# Patient Record
Sex: FEMALE | URBAN - METROPOLITAN AREA
[De-identification: names, ages, dates, MRNs, and addresses within clinical notes are randomized per-mention and may not be internally consistent; named-entity substitution may affect disease eponyms.]

---

## 2019-11-04 ENCOUNTER — APPOINTMENT (OUTPATIENT)
Age: 49
Setting detail: DERMATOLOGY
End: 2019-11-06

## 2019-11-04 DIAGNOSIS — Z41.9 ENCOUNTER FOR PROCEDURE FOR PURPOSES OTHER THAN REMEDYING HEALTH STATE, UNSPECIFIED: ICD-10-CM

## 2019-11-04 PROCEDURE — OTHER JUVEDERM VOLLURE XC INJECTION: OTHER

## 2019-11-04 PROCEDURE — OTHER JUVEDERM ULTRA XC INJECTION: OTHER

## 2019-11-04 NOTE — PROCEDURE: JUVEDERM ULTRA XC INJECTION
Consent: -Assessment \"Ej Aesthetics Scales\":\\nWrinkles: mild, Lines present at rest: none to mild, Folds: mild, Volume loss/skin laxity: mild.\\n-Treatment areas reviewed with patient holding a hand held mirror. The patient has realistic expectations.  \\n-Written consent obtained.  Questions answered. Patient verbalized understanding of the content.  See written informed consent on file. \\n-Topical anesthesia was achieved with 23% lidocaine, 7% tetracaine for 30 minutes.\\n-Topical anesthesia(lot#[id number]@3 exp. 12/27/19) was removed. \\n-Listerine mouth rinse provided.\\n-The skin was prepped with with alcohol and chlorhexadine prep. \\n-Aspetic technique maintained throughout procedure.\\n-The filler was administered to the treatment areas noted above. \\n-A 31g BD insuline syringe was back filled with JuveDerm Ultra to deliver micro-droplet technique where necessary.\\n-The patient tolerated the procedure well w/o incident. \\n-Vaseline applied to lips\\n-Additional product (hyaluronic ) may be necessary for optimal correction following treatment and/or maintenance. Consent: -Assessment \"Ej Aesthetics Scales\":\\nWrinkles: mild, Lines present at rest: none to mild, Folds: mild, Volume loss/skin laxity: mild.\\n-Treatment areas reviewed with patient holding a hand held mirror. The patient has realistic expectations.  \\n-Written consent obtained.  Questions answered. Patient verbalized understanding of the content.  See written informed consent on file. \\n-Topical anesthesia was achieved with 23% lidocaine, 7% tetracaine for 30 minutes.\\n-Topical anesthesia(lot#[id number]@3 exp. 12/27/19) was removed. \\n-Listerine mouth rinse provided.\\n-The skin was prepped with with alcohol and chlorhexadine prep. \\n-Aspetic technique maintained throughout procedure.\\n-The filler was administered to the treatment areas noted above. \\n-A 31g BD insuline syringe was back filled with JuveDerm Ultra to deliver micro-droplet technique where necessary.\\n-The patient tolerated the procedure well w/o incident. \\n-Vaseline applied to lips\\n-Additional product (hyaluronic ) may be necessary for optimal correction following treatment and/or maintenance.

## 2019-11-04 NOTE — PROCEDURE: JUVEDERM VOLLURE XC INJECTION
Price (Use Numbers Only, No Special Characters Or $): 950 Price (Use Numbers Only, No Special Characters Or $): 808

## 2019-11-04 NOTE — PROCEDURE: JUVEDERM ULTRA XC INJECTION
Price (Use Numbers Only, No Special Characters Or $): 356 Price (Use Numbers Only, No Special Characters Or $): 190

## 2019-11-04 NOTE — PROCEDURE: JUVEDERM VOLLURE XC INJECTION
Consent: -Assessment \"Ej Aesthetics Scales\":\\nWrinkles: mild, Lines present at rest: none-mild, Folds: mild, Volume loss/skin laxity: mild.\\n-Treatment areas reviewed with patient holding a hand held mirror. The patient has realistic expectations.  \\n-Written consent obtained. Topical anesthesia was achieved with 23% lidocaine, 7% tetracaine perioral and lips for 30minutes. Questions answered. Patient verbalized understanding of the content.  See written informed consent on file. \\n-Topical anesthesia(lot#[id number]@3, exp 12/27/19) was removed. \\n-Listerine mouth rinse provided.\\n-The skin was prepped with with alcohol and chlorhexadine prep. \\n-The filler was administered to the treatment areas noted above. \\n-Aseptic technique was maintained throughout procedure.\\n-The patient tolerated the procedure well w/o incident. \\n-Vaseline applied to lips post procedure.\\n-Additional product (hyaluronic ) may be necessary for optimal correction following treatment and/or maintenance.\\n-Ice provided post treatment for 2 to 5 minutes and or to take home. \\n-Patient instructed to apply ice 20 minutes on, 20 minutes off while awake for one to 1 to 2 days to reduce swelling. Avoid massage in the area. An over-the-counter antihistamine may be beneficial for the first 72 hours and was recommended to help with swelling.  Patient instructed to notify clinic if any bruising worsens, travels or becomes painful. Consent: -Assessment \"Ej Aesthetics Scales\":\\nWrinkles: mild, Lines present at rest: none-mild, Folds: mild, Volume loss/skin laxity: mild.\\n-Treatment areas reviewed with patient holding a hand held mirror. The patient has realistic expectations.  \\n-Written consent obtained. Topical anesthesia was achieved with 23% lidocaine, 7% tetracaine perioral and lips for 30minutes. Questions answered. Patient verbalized understanding of the content.  See written informed consent on file. \\n-Topical anesthesia(lot#[id number]@3, exp 12/27/19) was removed. \\n-Listerine mouth rinse provided.\\n-The skin was prepped with with alcohol and chlorhexadine prep. \\n-The filler was administered to the treatment areas noted above. \\n-Aseptic technique was maintained throughout procedure.\\n-The patient tolerated the procedure well w/o incident. \\n-Vaseline applied to lips post procedure.\\n-Additional product (hyaluronic ) may be necessary for optimal correction following treatment and/or maintenance.\\n-Ice provided post treatment for 2 to 5 minutes and or to take home. \\n-Patient instructed to apply ice 20 minutes on, 20 minutes off while awake for one to 1 to 2 days to reduce swelling. Avoid massage in the area. An over-the-counter antihistamine may be beneficial for the first 72 hours and was recommended to help with swelling.  Patient instructed to notify clinic if any bruising worsens, travels or becomes painful.

## 2023-06-12 ENCOUNTER — TRANSFERRED RECORDS (OUTPATIENT)
Dept: HEALTH INFORMATION MANAGEMENT | Facility: CLINIC | Age: 53
End: 2023-06-12

## 2024-02-19 ENCOUNTER — LAB REQUISITION (OUTPATIENT)
Dept: LAB | Facility: CLINIC | Age: 54
End: 2024-02-19
Payer: COMMERCIAL

## 2024-02-19 DIAGNOSIS — E28.2 POLYCYSTIC OVARIAN SYNDROME: ICD-10-CM

## 2024-02-19 DIAGNOSIS — N95.1 MENOPAUSAL AND FEMALE CLIMACTERIC STATES: ICD-10-CM

## 2024-02-19 DIAGNOSIS — L65.9 NONSCARRING HAIR LOSS, UNSPECIFIED: ICD-10-CM

## 2024-02-19 LAB
ALBUMIN SERPL BCG-MCNC: 4.6 G/DL (ref 3.5–5.2)
ALP SERPL-CCNC: 48 U/L (ref 40–150)
ALT SERPL W P-5'-P-CCNC: 12 U/L (ref 0–50)
ANION GAP SERPL CALCULATED.3IONS-SCNC: 12 MMOL/L (ref 7–15)
AST SERPL W P-5'-P-CCNC: 19 U/L (ref 0–45)
BASOPHILS # BLD AUTO: 0.1 10E3/UL (ref 0–0.2)
BASOPHILS NFR BLD AUTO: 1 %
BILIRUB SERPL-MCNC: 0.3 MG/DL
BUN SERPL-MCNC: 19 MG/DL (ref 6–20)
CALCIUM SERPL-MCNC: 9.4 MG/DL (ref 8.6–10)
CHLORIDE SERPL-SCNC: 103 MMOL/L (ref 98–107)
CREAT SERPL-MCNC: 0.72 MG/DL (ref 0.51–0.95)
DEPRECATED HCO3 PLAS-SCNC: 23 MMOL/L (ref 22–29)
EGFRCR SERPLBLD CKD-EPI 2021: >90 ML/MIN/1.73M2
EOSINOPHIL # BLD AUTO: 0.1 10E3/UL (ref 0–0.7)
EOSINOPHIL NFR BLD AUTO: 2 %
ERYTHROCYTE [DISTWIDTH] IN BLOOD BY AUTOMATED COUNT: 13.1 % (ref 10–15)
FERRITIN SERPL-MCNC: 81 NG/ML (ref 11–328)
FSH SERPL IRP2-ACNC: 5.4 MIU/ML
GLUCOSE SERPL-MCNC: 93 MG/DL (ref 70–99)
HBA1C MFR BLD: 5.4 %
HCT VFR BLD AUTO: 39.9 % (ref 35–47)
HGB BLD-MCNC: 12.8 G/DL (ref 11.7–15.7)
IMM GRANULOCYTES # BLD: 0 10E3/UL
IMM GRANULOCYTES NFR BLD: 0 %
IRON BINDING CAPACITY (ROCHE): 373 UG/DL (ref 240–430)
IRON SATN MFR SERPL: 28 % (ref 15–46)
IRON SERPL-MCNC: 106 UG/DL (ref 37–145)
LYMPHOCYTES # BLD AUTO: 2.9 10E3/UL (ref 0.8–5.3)
LYMPHOCYTES NFR BLD AUTO: 31 %
MCH RBC QN AUTO: 30.6 PG (ref 26.5–33)
MCHC RBC AUTO-ENTMCNC: 32.1 G/DL (ref 31.5–36.5)
MCV RBC AUTO: 96 FL (ref 78–100)
MONOCYTES # BLD AUTO: 0.6 10E3/UL (ref 0–1.3)
MONOCYTES NFR BLD AUTO: 6 %
NEUTROPHILS # BLD AUTO: 5.6 10E3/UL (ref 1.6–8.3)
NEUTROPHILS NFR BLD AUTO: 60 %
NRBC # BLD AUTO: 0 10E3/UL
NRBC BLD AUTO-RTO: 0 /100
PLATELET # BLD AUTO: 345 10E3/UL (ref 150–450)
POTASSIUM SERPL-SCNC: 3.8 MMOL/L (ref 3.4–5.3)
PROT SERPL-MCNC: 7.5 G/DL (ref 6.4–8.3)
RBC # BLD AUTO: 4.18 10E6/UL (ref 3.8–5.2)
SODIUM SERPL-SCNC: 138 MMOL/L (ref 135–145)
TRANSFERRIN SERPL-MCNC: 300 MG/DL (ref 200–360)
TSH SERPL DL<=0.005 MIU/L-ACNC: 1.63 UIU/ML (ref 0.3–4.2)
WBC # BLD AUTO: 9.3 10E3/UL (ref 4–11)

## 2024-02-19 PROCEDURE — 83036 HEMOGLOBIN GLYCOSYLATED A1C: CPT | Mod: ORL | Performed by: OBSTETRICS & GYNECOLOGY

## 2024-02-19 PROCEDURE — 82728 ASSAY OF FERRITIN: CPT | Mod: ORL | Performed by: OBSTETRICS & GYNECOLOGY

## 2024-02-19 PROCEDURE — 80053 COMPREHEN METABOLIC PANEL: CPT | Mod: ORL | Performed by: OBSTETRICS & GYNECOLOGY

## 2024-02-19 PROCEDURE — 83001 ASSAY OF GONADOTROPIN (FSH): CPT | Mod: ORL | Performed by: OBSTETRICS & GYNECOLOGY

## 2024-02-19 PROCEDURE — 83550 IRON BINDING TEST: CPT | Mod: ORL | Performed by: OBSTETRICS & GYNECOLOGY

## 2024-02-19 PROCEDURE — 84403 ASSAY OF TOTAL TESTOSTERONE: CPT | Mod: ORL | Performed by: OBSTETRICS & GYNECOLOGY

## 2024-02-19 PROCEDURE — 82157 ASSAY OF ANDROSTENEDIONE: CPT | Mod: ORL | Performed by: OBSTETRICS & GYNECOLOGY

## 2024-02-19 PROCEDURE — 84443 ASSAY THYROID STIM HORMONE: CPT | Mod: ORL | Performed by: OBSTETRICS & GYNECOLOGY

## 2024-02-19 PROCEDURE — 84466 ASSAY OF TRANSFERRIN: CPT | Mod: ORL | Performed by: OBSTETRICS & GYNECOLOGY

## 2024-02-19 PROCEDURE — 85025 COMPLETE CBC W/AUTO DIFF WBC: CPT | Mod: ORL | Performed by: OBSTETRICS & GYNECOLOGY

## 2024-02-19 PROCEDURE — 84270 ASSAY OF SEX HORMONE GLOBUL: CPT | Mod: ORL | Performed by: OBSTETRICS & GYNECOLOGY

## 2024-02-19 PROCEDURE — 82627 DEHYDROEPIANDROSTERONE: CPT | Mod: ORL | Performed by: OBSTETRICS & GYNECOLOGY

## 2024-02-19 PROCEDURE — 83498 ASY HYDROXYPROGESTERONE 17-D: CPT | Mod: ORL | Performed by: OBSTETRICS & GYNECOLOGY

## 2024-02-20 LAB
DHEA-S SERPL-MCNC: 82 UG/DL (ref 35–430)
SHBG SERPL-SCNC: 318 NMOL/L (ref 30–135)

## 2024-02-21 LAB
TESTOST FREE SERPL-MCNC: 0.04 NG/DL
TESTOST SERPL-MCNC: 14 NG/DL (ref 8–60)

## 2024-02-22 ENCOUNTER — TRANSFERRED RECORDS (OUTPATIENT)
Dept: HEALTH INFORMATION MANAGEMENT | Facility: CLINIC | Age: 54
End: 2024-02-22

## 2024-02-22 LAB
17OHP SERPL-MCNC: <10 NG/DL
ANDROST SERPL-MCNC: 0.36 NG/ML

## 2024-10-14 ENCOUNTER — LAB REQUISITION (OUTPATIENT)
Dept: LAB | Facility: CLINIC | Age: 54
End: 2024-10-14
Payer: COMMERCIAL

## 2024-10-14 DIAGNOSIS — R39.9 UNSPECIFIED SYMPTOMS AND SIGNS INVOLVING THE GENITOURINARY SYSTEM: ICD-10-CM

## 2024-10-14 PROCEDURE — 87086 URINE CULTURE/COLONY COUNT: CPT | Mod: ORL | Performed by: OBSTETRICS & GYNECOLOGY

## 2024-10-16 LAB — BACTERIA UR CULT: NO GROWTH

## 2024-10-21 ENCOUNTER — LAB REQUISITION (OUTPATIENT)
Dept: LAB | Facility: CLINIC | Age: 54
End: 2024-10-21
Payer: COMMERCIAL

## 2024-10-21 DIAGNOSIS — R31.29 OTHER MICROSCOPIC HEMATURIA: ICD-10-CM

## 2024-10-21 LAB
ALBUMIN UR-MCNC: NEGATIVE MG/DL
APPEARANCE UR: CLEAR
BILIRUB UR QL STRIP: NEGATIVE
COLOR UR AUTO: ABNORMAL
GLUCOSE UR STRIP-MCNC: NEGATIVE MG/DL
HGB UR QL STRIP: ABNORMAL
KETONES UR STRIP-MCNC: NEGATIVE MG/DL
LEUKOCYTE ESTERASE UR QL STRIP: NEGATIVE
MUCOUS THREADS #/AREA URNS LPF: PRESENT /LPF
NITRATE UR QL: NEGATIVE
PH UR STRIP: 5.5 [PH] (ref 5–7)
RBC URINE: 1 /HPF
SP GR UR STRIP: 1.02 (ref 1–1.03)
SQUAMOUS EPITHELIAL: 5 /HPF
UROBILINOGEN UR STRIP-MCNC: NORMAL MG/DL
WBC URINE: 0 /HPF

## 2024-10-21 PROCEDURE — 81001 URINALYSIS AUTO W/SCOPE: CPT | Mod: ORL | Performed by: PHYSICIAN ASSISTANT

## 2024-10-29 ENCOUNTER — MEDICAL CORRESPONDENCE (OUTPATIENT)
Dept: HEALTH INFORMATION MANAGEMENT | Facility: CLINIC | Age: 54
End: 2024-10-29

## 2024-10-29 LAB
CHOLESTEROL (EXTERNAL): 234 MG/DL (ref 0–199)
CREATININE (EXTERNAL): 0.71 MG/DL (ref 0.55–1.02)
GFR ESTIMATED (EXTERNAL): >60 ML/MIN/1.73M2
GLUCOSE (EXTERNAL): 95 MG/DL (ref 70–100)
HDLC SERPL-MCNC: 71 MG/DL
LDL CHOLESTEROL CALCULATED (EXTERNAL): 143 MG/DL
NON HDL CHOLESTEROL (EXTERNAL): 163 MG/DL
POTASSIUM (EXTERNAL): 4.5 MMOL/L (ref 3.5–5.1)
TRIGLYCERIDES (EXTERNAL): 101 MG/DL

## 2025-07-14 ENCOUNTER — TRANSCRIBE ORDERS (OUTPATIENT)
Dept: OTHER | Age: 55
End: 2025-07-14

## 2025-07-14 DIAGNOSIS — M25.50 ARTHRALGIA, UNSPECIFIED JOINT: Primary | ICD-10-CM

## 2025-07-15 ENCOUNTER — PATIENT OUTREACH (OUTPATIENT)
Dept: CARE COORDINATION | Facility: CLINIC | Age: 55
End: 2025-07-15
Payer: COMMERCIAL

## 2025-07-17 ENCOUNTER — PATIENT OUTREACH (OUTPATIENT)
Dept: CARE COORDINATION | Facility: CLINIC | Age: 55
End: 2025-07-17
Payer: COMMERCIAL